# Patient Record
Sex: MALE | URBAN - METROPOLITAN AREA
[De-identification: names, ages, dates, MRNs, and addresses within clinical notes are randomized per-mention and may not be internally consistent; named-entity substitution may affect disease eponyms.]

---

## 2020-06-04 ENCOUNTER — OFFICE VISIT (OUTPATIENT)
Dept: URBAN - METROPOLITAN AREA CLINIC 11 | Facility: CLINIC | Age: 55
End: 2020-06-04
Payer: COMMERCIAL

## 2020-06-04 DIAGNOSIS — H52.4 PRESBYOPIA: ICD-10-CM

## 2020-06-04 PROCEDURE — 92004 COMPRE OPH EXAM NEW PT 1/>: CPT | Performed by: OPTOMETRIST

## 2020-06-04 RX ORDER — FLUOROMETHOLONE 1 MG/ML
0.1 % SOLUTION/ DROPS OPHTHALMIC
Qty: 1 | Refills: 1 | Status: INACTIVE
Start: 2020-06-04 | End: 2020-06-16

## 2020-06-04 ASSESSMENT — KERATOMETRY
OS: 39.63
OD: 40.50

## 2020-06-04 ASSESSMENT — INTRAOCULAR PRESSURE
OD: 20
OS: 20

## 2020-06-04 ASSESSMENT — VISUAL ACUITY
OD: 20/25
OS: 20/25

## 2020-06-04 NOTE — IMPRESSION/PLAN
Impression: Dry eye syndrome of bilateral lacrimal glands: H04.123. Plan: Rx FML tid ou.  Recommend art tears tid ou. f/u 1 month

## 2021-04-22 ENCOUNTER — OFFICE VISIT (OUTPATIENT)
Dept: URBAN - METROPOLITAN AREA CLINIC 11 | Facility: CLINIC | Age: 56
End: 2021-04-22
Payer: COMMERCIAL

## 2021-04-22 DIAGNOSIS — H04.123 DRY EYE SYNDROME OF BILATERAL LACRIMAL GLANDS: Primary | ICD-10-CM

## 2021-04-22 PROCEDURE — 92014 COMPRE OPH EXAM EST PT 1/>: CPT | Performed by: OPTOMETRIST

## 2021-04-22 ASSESSMENT — INTRAOCULAR PRESSURE
OS: 16
OD: 16

## 2021-04-22 ASSESSMENT — VISUAL ACUITY
OS: 20/20
OD: 20/20

## 2021-04-22 ASSESSMENT — KERATOMETRY
OS: 39.75
OD: 39.88